# Patient Record
(demographics unavailable — no encounter records)

---

## 2025-05-21 NOTE — PHYSICAL EXAM
[Normal Breath Sounds] : Normal breath sounds [No Rash or Lesion] : No rash or lesion [Alert] : alert [Calm] : calm [Abdomen Tenderness] : ~T No ~M abdominal tenderness [JVD] : no jugular venous distention  [de-identified] : Uncomfortable appearing male in NAD [de-identified] : MMM [de-identified] : ROM WNL [FreeTextEntry1] : The pt was examined in the prone michelle-knife position with a medical assistant present for the entirety of the examination. Visual examination of the anal verge with effacement of the buttocks revealed a likely infected posterior fissure with mildly inflamed LL external hemorrhoid with possible concurrent fistula formation? Otherwise no masses. Digital rectal exam and anoscopy deferred to follow up.  The patient tolerated the exam well.

## 2025-05-21 NOTE — PLAN
[TextEntry] : - Short course abx to improve symptomatology - Topical therapy - Warm soaks frequently - Barrier ointment in between to limit chafing - Hold on topical steroids. - RTC in 1 week for repeat examination and consideration for surgical options.

## 2025-05-21 NOTE — HISTORY OF PRESENT ILLNESS
[FreeTextEntry1] : 32 y/o M presents for initial visit, patient presents with c/o Chronic Thrombosed Hemorrhoid x 2 months 2/2 anal sex. Patient states he went to the [place] ED 2 days ago for pain.  Patient reports he has been using PrepH /Lidocaine, HC suppositories sitz bath, ice pack. Taking Tylenol every 6 hours. Cycling everything and reports minimal relief. Reports moving bowels daily and noting bleeding w/ most BMs. Describes sharp pain. Feels external tissue swelling that does not resolve.   PMH: Denies PSH: Denies FH: Denies CRC/IBD Meds: PrEP Allergies: NKDA C-scope:  Last year